# Patient Record
Sex: FEMALE | Race: BLACK OR AFRICAN AMERICAN | NOT HISPANIC OR LATINO | URBAN - METROPOLITAN AREA
[De-identification: names, ages, dates, MRNs, and addresses within clinical notes are randomized per-mention and may not be internally consistent; named-entity substitution may affect disease eponyms.]

---

## 2018-06-29 ENCOUNTER — EMERGENCY (EMERGENCY)
Facility: HOSPITAL | Age: 38
LOS: 0 days | Discharge: HOME | End: 2018-06-29
Attending: EMERGENCY MEDICINE | Admitting: EMERGENCY MEDICINE

## 2018-06-29 VITALS
TEMPERATURE: 99 F | HEART RATE: 78 BPM | OXYGEN SATURATION: 98 % | RESPIRATION RATE: 18 BRPM | DIASTOLIC BLOOD PRESSURE: 59 MMHG | SYSTOLIC BLOOD PRESSURE: 113 MMHG

## 2018-06-29 VITALS
OXYGEN SATURATION: 98 % | DIASTOLIC BLOOD PRESSURE: 71 MMHG | HEART RATE: 73 BPM | TEMPERATURE: 99 F | SYSTOLIC BLOOD PRESSURE: 116 MMHG | RESPIRATION RATE: 18 BRPM

## 2018-06-29 DIAGNOSIS — R19.7 DIARRHEA, UNSPECIFIED: ICD-10-CM

## 2018-06-29 DIAGNOSIS — R10.9 UNSPECIFIED ABDOMINAL PAIN: ICD-10-CM

## 2018-06-29 LAB
ALBUMIN SERPL ELPH-MCNC: 4.4 G/DL — SIGNIFICANT CHANGE UP (ref 3.5–5.2)
ALP SERPL-CCNC: 55 U/L — SIGNIFICANT CHANGE UP (ref 30–115)
ALT FLD-CCNC: 11 U/L — SIGNIFICANT CHANGE UP (ref 0–41)
ANION GAP SERPL CALC-SCNC: 15 MMOL/L — HIGH (ref 7–14)
AST SERPL-CCNC: 16 U/L — SIGNIFICANT CHANGE UP (ref 0–41)
BASOPHILS # BLD AUTO: 0.03 K/UL — SIGNIFICANT CHANGE UP (ref 0–0.2)
BASOPHILS NFR BLD AUTO: 0.4 % — SIGNIFICANT CHANGE UP (ref 0–1)
BILIRUB SERPL-MCNC: 0.5 MG/DL — SIGNIFICANT CHANGE UP (ref 0.2–1.2)
BUN SERPL-MCNC: 10 MG/DL — SIGNIFICANT CHANGE UP (ref 10–20)
CALCIUM SERPL-MCNC: 8.7 MG/DL — SIGNIFICANT CHANGE UP (ref 8.5–10.1)
CHLORIDE SERPL-SCNC: 100 MMOL/L — SIGNIFICANT CHANGE UP (ref 98–110)
CO2 SERPL-SCNC: 22 MMOL/L — SIGNIFICANT CHANGE UP (ref 17–32)
CREAT SERPL-MCNC: 0.9 MG/DL — SIGNIFICANT CHANGE UP (ref 0.7–1.5)
EOSINOPHIL # BLD AUTO: 0.01 K/UL — SIGNIFICANT CHANGE UP (ref 0–0.7)
EOSINOPHIL NFR BLD AUTO: 0.1 % — SIGNIFICANT CHANGE UP (ref 0–8)
GLUCOSE SERPL-MCNC: 106 MG/DL — HIGH (ref 70–99)
HCT VFR BLD CALC: 37.7 % — SIGNIFICANT CHANGE UP (ref 37–47)
HGB BLD-MCNC: 12.8 G/DL — SIGNIFICANT CHANGE UP (ref 12–16)
IMM GRANULOCYTES NFR BLD AUTO: 0.6 % — HIGH (ref 0.1–0.3)
LACTATE SERPL-SCNC: 0.8 MMOL/L — SIGNIFICANT CHANGE UP (ref 0.5–2.2)
LIDOCAIN IGE QN: 24 U/L — SIGNIFICANT CHANGE UP (ref 7–60)
LYMPHOCYTES # BLD AUTO: 0.75 K/UL — LOW (ref 1.2–3.4)
LYMPHOCYTES # BLD AUTO: 8.9 % — LOW (ref 20.5–51.1)
MAGNESIUM SERPL-MCNC: 1.8 MG/DL — SIGNIFICANT CHANGE UP (ref 1.8–2.4)
MCHC RBC-ENTMCNC: 28.6 PG — SIGNIFICANT CHANGE UP (ref 27–31)
MCHC RBC-ENTMCNC: 34 G/DL — SIGNIFICANT CHANGE UP (ref 32–37)
MCV RBC AUTO: 84.2 FL — SIGNIFICANT CHANGE UP (ref 81–99)
MONOCYTES # BLD AUTO: 0.47 K/UL — SIGNIFICANT CHANGE UP (ref 0.1–0.6)
MONOCYTES NFR BLD AUTO: 5.6 % — SIGNIFICANT CHANGE UP (ref 1.7–9.3)
NEUTROPHILS # BLD AUTO: 7.14 K/UL — HIGH (ref 1.4–6.5)
NEUTROPHILS NFR BLD AUTO: 84.4 % — HIGH (ref 42.2–75.2)
PLATELET # BLD AUTO: 217 K/UL — SIGNIFICANT CHANGE UP (ref 130–400)
POTASSIUM SERPL-MCNC: 4.1 MMOL/L — SIGNIFICANT CHANGE UP (ref 3.5–5)
POTASSIUM SERPL-SCNC: 4.1 MMOL/L — SIGNIFICANT CHANGE UP (ref 3.5–5)
PROT SERPL-MCNC: 7.6 G/DL — SIGNIFICANT CHANGE UP (ref 6–8)
RBC # BLD: 4.48 M/UL — SIGNIFICANT CHANGE UP (ref 4.2–5.4)
RBC # FLD: 12 % — SIGNIFICANT CHANGE UP (ref 11.5–14.5)
SODIUM SERPL-SCNC: 137 MMOL/L — SIGNIFICANT CHANGE UP (ref 135–146)
WBC # BLD: 8.45 K/UL — SIGNIFICANT CHANGE UP (ref 4.8–10.8)
WBC # FLD AUTO: 8.45 K/UL — SIGNIFICANT CHANGE UP (ref 4.8–10.8)

## 2018-06-29 RX ORDER — KETOROLAC TROMETHAMINE 30 MG/ML
15 SYRINGE (ML) INJECTION ONCE
Qty: 0 | Refills: 0 | Status: DISCONTINUED | OUTPATIENT
Start: 2018-06-29 | End: 2018-06-29

## 2018-06-29 RX ORDER — SODIUM CHLORIDE 9 MG/ML
1000 INJECTION INTRAMUSCULAR; INTRAVENOUS; SUBCUTANEOUS ONCE
Qty: 0 | Refills: 0 | Status: COMPLETED | OUTPATIENT
Start: 2018-06-29 | End: 2018-06-29

## 2018-06-29 RX ADMIN — Medication 15 MILLIGRAM(S): at 13:00

## 2018-06-29 RX ADMIN — SODIUM CHLORIDE 1000 MILLILITER(S): 9 INJECTION INTRAMUSCULAR; INTRAVENOUS; SUBCUTANEOUS at 13:00

## 2018-06-29 RX ADMIN — Medication 15 MILLIGRAM(S): at 13:30

## 2018-06-29 NOTE — ED PROVIDER NOTE - PHYSICAL EXAMINATION
Constitutional: Well developed, well nourished. NAD.  Head: Normocephalic, atraumatic.  Eyes: PERRL. EOMI.  ENT: No nasal discharge. Mucous membranes slightly dry.  Neck: Supple. Painless ROM.  Cardiovascular: Normal S1, S2. Regular rate and rhythm. No murmurs, rubs, or gallops.  Pulmonary: Normal respiratory rate and effort. Lungs clear to auscultation bilaterally. No wheezing, rales, or rhonchi.  Abdominal: Soft. Nondistended. Nontender. No rebound, guarding, rigidity.  Extremities. Pelvis stable. No lower extremity edema, symmetric calves.  Skin: No rashes, cyanosis.  Neuro: AAOx3. No focal neurological deficits.  Psych: Normal mood. Normal affect.

## 2018-06-29 NOTE — ED PROVIDER NOTE - PROGRESS NOTE DETAILS
Pt feels better at this time. Labs reviewed and discussed with patient, no need for imaging or abx. Strict return precautions given.

## 2018-06-29 NOTE — ED STATDOCS - ATTENDING CONTRIBUTION TO CARE
37 y.o. female c/o intermittent abdominal cramps and diarrhea for 2 days. No fever/chills, CP/SOB, back pain or urinary symptoms. Good appetite. No vaginal discharge or bleeding. On exam, pt in NAD, AAOx3, head NC/AT, CN II-XII intact, lungs CTA B/L, CV S1S2 regular, abdomen soft/NT/ND/(+)BS, ext (-) edema. Will do labs, IVF, toradol and reevaluate.

## 2018-06-29 NOTE — ED PROVIDER NOTE - NS ED ROS FT
Constitutional: No fever, chills.  Eyes: No visual changes.  ENT: No hearing changes. No sore throat.  Neck: No neck pain or stiffness.  Cardiovascular: No chest pain, palpitations, edema.  Pulmonary: No SOB, cough. No hemoptysis.  Abdominal: No abdominal pain, nausea, vomiting. + diarrhea.  : No dysuria, frequency.  Neuro: No headache, syncope, dizziness.  MS: No back pain. No calf pain/swelling.  Psych: No suicidal ideations.

## 2018-06-29 NOTE — ED PROVIDER NOTE - ATTENDING CONTRIBUTION TO CARE
37 y.o. female, no PMH, c/o 3 day h/o diarrhea associated with mild abdominal cramps. No fever/chills, n/v, urinary symptoms or vaginal discharge. Agree with resident's exam. Will 37 y.o. female, no PMH, c/o 3 day h/o diarrhea associated with mild abdominal cramps. No fever/chills, n/v, urinary symptoms or vaginal discharge. Agree with resident's exam. Will do labs, hydrate and reevaluate . 2017 04:00

## 2018-06-29 NOTE — ED PROVIDER NOTE - OBJECTIVE STATEMENT
Pt is a 36 y/o female with no PMH who presents to ED for diarrhea for 3 days, about 5 episodes per day, non bloody. + minimal abd cramps. No n/v, fever, chest pain, SOB.

## 2018-06-29 NOTE — ED ADULT NURSE NOTE - OBJECTIVE STATEMENT
Patient presents with lower severe abdominal pain with loose stool since tuesday. Also complaining of nausea. Denies any increase or burning urination.

## 2024-10-30 ENCOUNTER — INPATIENT (INPATIENT)
Facility: HOSPITAL | Age: 44
LOS: 1 days | Discharge: HOME | DRG: 541 | End: 2024-11-01
Attending: INTERNAL MEDICINE | Admitting: INTERNAL MEDICINE
Payer: COMMERCIAL

## 2024-10-30 VITALS
WEIGHT: 279.99 LBS | SYSTOLIC BLOOD PRESSURE: 117 MMHG | OXYGEN SATURATION: 99 % | HEIGHT: 68 IN | DIASTOLIC BLOOD PRESSURE: 87 MMHG | HEART RATE: 72 BPM | RESPIRATION RATE: 17 BRPM | TEMPERATURE: 99 F

## 2024-10-30 DIAGNOSIS — M86.9 OSTEOMYELITIS, UNSPECIFIED: ICD-10-CM

## 2024-10-30 LAB
ALBUMIN SERPL ELPH-MCNC: 3.7 G/DL — SIGNIFICANT CHANGE UP (ref 3.5–5)
ALP SERPL-CCNC: 62 U/L — SIGNIFICANT CHANGE UP (ref 40–120)
ALT FLD-CCNC: 26 U/L DA — SIGNIFICANT CHANGE UP (ref 10–60)
ANION GAP SERPL CALC-SCNC: 3 MMOL/L — LOW (ref 5–17)
AST SERPL-CCNC: 19 U/L — SIGNIFICANT CHANGE UP (ref 10–40)
BASOPHILS # BLD AUTO: 0.06 K/UL — SIGNIFICANT CHANGE UP (ref 0–0.2)
BASOPHILS NFR BLD AUTO: 0.8 % — SIGNIFICANT CHANGE UP (ref 0–2)
BILIRUB SERPL-MCNC: 0.4 MG/DL — SIGNIFICANT CHANGE UP (ref 0.2–1.2)
BUN SERPL-MCNC: 11 MG/DL — SIGNIFICANT CHANGE UP (ref 7–18)
CALCIUM SERPL-MCNC: 9.1 MG/DL — SIGNIFICANT CHANGE UP (ref 8.4–10.5)
CHLORIDE SERPL-SCNC: 107 MMOL/L — SIGNIFICANT CHANGE UP (ref 96–108)
CO2 SERPL-SCNC: 29 MMOL/L — SIGNIFICANT CHANGE UP (ref 22–31)
CREAT SERPL-MCNC: 0.98 MG/DL — SIGNIFICANT CHANGE UP (ref 0.5–1.3)
CRP SERPL-MCNC: 12.5 MG/L — HIGH (ref 0–5)
EGFR: 73 ML/MIN/1.73M2 — SIGNIFICANT CHANGE UP
EOSINOPHIL # BLD AUTO: 0.29 K/UL — SIGNIFICANT CHANGE UP (ref 0–0.5)
EOSINOPHIL NFR BLD AUTO: 3.9 % — SIGNIFICANT CHANGE UP (ref 0–6)
ERYTHROCYTE [SEDIMENTATION RATE] IN BLOOD: 9 MM/HR — SIGNIFICANT CHANGE UP (ref 0–15)
GLUCOSE SERPL-MCNC: 104 MG/DL — HIGH (ref 70–99)
HCT VFR BLD CALC: 35.6 % — SIGNIFICANT CHANGE UP (ref 34.5–45)
HGB BLD-MCNC: 11.9 G/DL — SIGNIFICANT CHANGE UP (ref 11.5–15.5)
IMM GRANULOCYTES NFR BLD AUTO: 0.3 % — SIGNIFICANT CHANGE UP (ref 0–0.9)
LYMPHOCYTES # BLD AUTO: 2.35 K/UL — SIGNIFICANT CHANGE UP (ref 1–3.3)
LYMPHOCYTES # BLD AUTO: 31.6 % — SIGNIFICANT CHANGE UP (ref 13–44)
MCHC RBC-ENTMCNC: 30 PG — SIGNIFICANT CHANGE UP (ref 27–34)
MCHC RBC-ENTMCNC: 33.4 G/DL — SIGNIFICANT CHANGE UP (ref 32–36)
MCV RBC AUTO: 89.7 FL — SIGNIFICANT CHANGE UP (ref 80–100)
MONOCYTES # BLD AUTO: 0.77 K/UL — SIGNIFICANT CHANGE UP (ref 0–0.9)
MONOCYTES NFR BLD AUTO: 10.3 % — SIGNIFICANT CHANGE UP (ref 2–14)
NEUTROPHILS # BLD AUTO: 3.95 K/UL — SIGNIFICANT CHANGE UP (ref 1.8–7.4)
NEUTROPHILS NFR BLD AUTO: 53.1 % — SIGNIFICANT CHANGE UP (ref 43–77)
NRBC # BLD: 0 /100 WBCS — SIGNIFICANT CHANGE UP (ref 0–0)
PLATELET # BLD AUTO: 242 K/UL — SIGNIFICANT CHANGE UP (ref 150–400)
POTASSIUM SERPL-MCNC: 4.2 MMOL/L — SIGNIFICANT CHANGE UP (ref 3.5–5.3)
POTASSIUM SERPL-SCNC: 4.2 MMOL/L — SIGNIFICANT CHANGE UP (ref 3.5–5.3)
PROT SERPL-MCNC: 7.4 G/DL — SIGNIFICANT CHANGE UP (ref 6–8.3)
RBC # BLD: 3.97 M/UL — SIGNIFICANT CHANGE UP (ref 3.8–5.2)
RBC # FLD: 12.6 % — SIGNIFICANT CHANGE UP (ref 10.3–14.5)
SODIUM SERPL-SCNC: 139 MMOL/L — SIGNIFICANT CHANGE UP (ref 135–145)
WBC # BLD: 7.44 K/UL — SIGNIFICANT CHANGE UP (ref 3.8–10.5)
WBC # FLD AUTO: 7.44 K/UL — SIGNIFICANT CHANGE UP (ref 3.8–10.5)

## 2024-10-30 PROCEDURE — 73630 X-RAY EXAM OF FOOT: CPT | Mod: 26,RT

## 2024-10-30 PROCEDURE — 99285 EMERGENCY DEPT VISIT HI MDM: CPT

## 2024-10-30 PROCEDURE — 93971 EXTREMITY STUDY: CPT | Mod: 26,RT

## 2024-10-30 RX ORDER — CEFEPIME 2 G/1
2000 INJECTION, POWDER, FOR SOLUTION INTRAVENOUS ONCE
Refills: 0 | Status: COMPLETED | OUTPATIENT
Start: 2024-10-30 | End: 2024-10-30

## 2024-10-30 RX ORDER — METRONIDAZOLE 250 MG/1
500 TABLET ORAL ONCE
Refills: 0 | Status: COMPLETED | OUTPATIENT
Start: 2024-10-30 | End: 2024-10-30

## 2024-10-30 RX ORDER — VANCOMYCIN HYDROCHLORIDE 50 MG/ML
1500 KIT ORAL ONCE
Refills: 0 | Status: COMPLETED | OUTPATIENT
Start: 2024-10-30 | End: 2024-10-30

## 2024-10-30 RX ADMIN — CEFEPIME 100 MILLIGRAM(S): 2 INJECTION, POWDER, FOR SOLUTION INTRAVENOUS at 22:47

## 2024-10-30 RX ADMIN — CEFEPIME 2000 MILLIGRAM(S): 2 INJECTION, POWDER, FOR SOLUTION INTRAVENOUS at 23:17

## 2024-10-30 RX ADMIN — METRONIDAZOLE 100 MILLIGRAM(S): 250 TABLET ORAL at 23:08

## 2024-10-30 NOTE — ED PROVIDER NOTE - IV ALTEPLASE INCLUSION HIDDEN
Patient called and requested a referral be put in for her to get a sleep study done. Patient stated that MD suggested that she get it done but never sent one in. Patient notified of call back time Patient requested a call back when referral is sent and stated a detailed message is okay if she does not answer.   show

## 2024-10-30 NOTE — ED PROVIDER NOTE - PHYSICAL EXAMINATION
Afebrile, hemodynamically stable, saturating well on room air  NAD, well appearing, sitting comfortably in bed, no WOB, speaking full sentences  Head NCAT  EOMI grossly, anicteric  MMM  RRR  Breathing comfortably on room air  AAO, CN's 3-12 grossly intact  MCINTYRE spontaneously, right leg edema with reddish spots without confluent erythema, unconnected right second toe swelling, wet sore between second and third toe, although no active drainage, no erythema, no tracking  Skin warm, well perfused

## 2024-10-30 NOTE — ED PROVIDER NOTE - OBJECTIVE STATEMENT
2024 20:19 44-year-old female with no past medical history, presents with swelling to her second right toe as well as sore between the second and third toes that she first noticed yesterday, there was having some discomfort to the area for 1 week. Also noted some purulent discharge today. Went to her PMD and did an x-ray which showed some metatarsal head destruction, and unremarkable labs, though did not get any instructions from her doctor. I reviewed these results on her phone. Denies all other symptoms including fever, chills, weakness or dizziness.

## 2024-10-30 NOTE — ED ADULT NURSE NOTE - NSFALLUNIVINTERV_ED_ALL_ED
Bed/Stretcher in lowest position, wheels locked, appropriate side rails in place/Call bell, personal items and telephone in reach/Instruct patient to call for assistance before getting out of bed/chair/stretcher/Non-slip footwear applied when patient is off stretcher/[place] to call system/Physically safe environment - no spills, clutter or unnecessary equipment/Purposeful proactive rounding/Room/bathroom lighting operational, light cord in reach

## 2024-10-31 DIAGNOSIS — L03.90 CELLULITIS, UNSPECIFIED: ICD-10-CM

## 2024-10-31 DIAGNOSIS — B35.3 TINEA PEDIS: ICD-10-CM

## 2024-10-31 DIAGNOSIS — Z29.9 ENCOUNTER FOR PROPHYLACTIC MEASURES, UNSPECIFIED: ICD-10-CM

## 2024-10-31 LAB
A1C WITH ESTIMATED AVERAGE GLUCOSE RESULT: 5.7 % — HIGH (ref 4–5.6)
ANION GAP SERPL CALC-SCNC: 4 MMOL/L — LOW (ref 5–17)
BUN SERPL-MCNC: 9 MG/DL — SIGNIFICANT CHANGE UP (ref 7–18)
CALCIUM SERPL-MCNC: 8.5 MG/DL — SIGNIFICANT CHANGE UP (ref 8.4–10.5)
CHLORIDE SERPL-SCNC: 105 MMOL/L — SIGNIFICANT CHANGE UP (ref 96–108)
CHOLEST SERPL-MCNC: 162 MG/DL — SIGNIFICANT CHANGE UP
CO2 SERPL-SCNC: 30 MMOL/L — SIGNIFICANT CHANGE UP (ref 22–31)
CREAT SERPL-MCNC: 0.94 MG/DL — SIGNIFICANT CHANGE UP (ref 0.5–1.3)
CRP SERPL-MCNC: 11.8 MG/L — HIGH (ref 0–5)
EGFR: 77 ML/MIN/1.73M2 — SIGNIFICANT CHANGE UP
ERYTHROCYTE [SEDIMENTATION RATE] IN BLOOD: 8 MM/HR — SIGNIFICANT CHANGE UP (ref 0–15)
ESTIMATED AVERAGE GLUCOSE: 117 MG/DL — HIGH (ref 68–114)
GLUCOSE SERPL-MCNC: 156 MG/DL — HIGH (ref 70–99)
HCT VFR BLD CALC: 33.9 % — LOW (ref 34.5–45)
HDLC SERPL-MCNC: 63 MG/DL — SIGNIFICANT CHANGE UP
HGB BLD-MCNC: 11.3 G/DL — LOW (ref 11.5–15.5)
LIPID PNL WITH DIRECT LDL SERPL: 88 MG/DL — SIGNIFICANT CHANGE UP
MAGNESIUM SERPL-MCNC: 1.9 MG/DL — SIGNIFICANT CHANGE UP (ref 1.6–2.6)
MCHC RBC-ENTMCNC: 29.9 PG — SIGNIFICANT CHANGE UP (ref 27–34)
MCHC RBC-ENTMCNC: 33.3 G/DL — SIGNIFICANT CHANGE UP (ref 32–36)
MCV RBC AUTO: 89.7 FL — SIGNIFICANT CHANGE UP (ref 80–100)
NON HDL CHOLESTEROL: 99 MG/DL — SIGNIFICANT CHANGE UP
NRBC # BLD: 0 /100 WBCS — SIGNIFICANT CHANGE UP (ref 0–0)
PHOSPHATE SERPL-MCNC: 3.2 MG/DL — SIGNIFICANT CHANGE UP (ref 2.5–4.5)
PLATELET # BLD AUTO: 214 K/UL — SIGNIFICANT CHANGE UP (ref 150–400)
POTASSIUM SERPL-MCNC: 3.5 MMOL/L — SIGNIFICANT CHANGE UP (ref 3.5–5.3)
POTASSIUM SERPL-SCNC: 3.5 MMOL/L — SIGNIFICANT CHANGE UP (ref 3.5–5.3)
RBC # BLD: 3.78 M/UL — LOW (ref 3.8–5.2)
RBC # FLD: 12.8 % — SIGNIFICANT CHANGE UP (ref 10.3–14.5)
SODIUM SERPL-SCNC: 139 MMOL/L — SIGNIFICANT CHANGE UP (ref 135–145)
TRIGL SERPL-MCNC: 56 MG/DL — SIGNIFICANT CHANGE UP
WBC # BLD: 6.4 K/UL — SIGNIFICANT CHANGE UP (ref 3.8–10.5)
WBC # FLD AUTO: 6.4 K/UL — SIGNIFICANT CHANGE UP (ref 3.8–10.5)

## 2024-10-31 PROCEDURE — 99222 1ST HOSP IP/OBS MODERATE 55: CPT

## 2024-10-31 RX ORDER — ENOXAPARIN SODIUM 40MG/0.4ML
40 SYRINGE (ML) SUBCUTANEOUS EVERY 24 HOURS
Refills: 0 | Status: DISCONTINUED | OUTPATIENT
Start: 2024-10-31 | End: 2024-11-01

## 2024-10-31 RX ORDER — CEFAZOLIN SODIUM 1 G
1000 VIAL (EA) INJECTION EVERY 8 HOURS
Refills: 0 | Status: DISCONTINUED | OUTPATIENT
Start: 2024-10-31 | End: 2024-11-01

## 2024-10-31 RX ORDER — CEFAZOLIN SODIUM 1 G
VIAL (EA) INJECTION
Refills: 0 | Status: DISCONTINUED | OUTPATIENT
Start: 2024-10-31 | End: 2024-11-01

## 2024-10-31 RX ORDER — CLOTRIMAZOLE 10 MG/G
1 CREAM TOPICAL
Refills: 0 | Status: DISCONTINUED | OUTPATIENT
Start: 2024-10-31 | End: 2024-11-01

## 2024-10-31 RX ORDER — CEFAZOLIN SODIUM 1 G
1000 VIAL (EA) INJECTION ONCE
Refills: 0 | Status: COMPLETED | OUTPATIENT
Start: 2024-10-31 | End: 2024-10-31

## 2024-10-31 RX ORDER — ACETAMINOPHEN 500 MG
650 TABLET ORAL EVERY 6 HOURS
Refills: 0 | Status: DISCONTINUED | OUTPATIENT
Start: 2024-10-31 | End: 2024-11-01

## 2024-10-31 RX ADMIN — Medication 100 MILLIGRAM(S): at 03:40

## 2024-10-31 RX ADMIN — Medication 100 MILLIGRAM(S): at 21:14

## 2024-10-31 RX ADMIN — Medication 40 MILLIGRAM(S): at 06:33

## 2024-10-31 RX ADMIN — Medication 100 MILLIGRAM(S): at 13:45

## 2024-10-31 RX ADMIN — CLOTRIMAZOLE 1 APPLICATION(S): 10 CREAM TOPICAL at 17:56

## 2024-10-31 RX ADMIN — VANCOMYCIN HYDROCHLORIDE 250 MILLIGRAM(S): KIT at 00:56

## 2024-10-31 NOTE — H&P ADULT - NSHPREVIEWOFSYSTEMS_GEN_ALL_CORE
- CONSTITUTIONAL: Denies fever and chills  - HEENT: Denies changes in vision and hearing.  - RESPIRATORY: Denies SOB and cough.  - CV: Denies chest pain and palpitations  - GI: Denies abdominal pain, nausea, vomiting and diarrhea.  - : Denies dysuria and urinary frequency.  - SKIN: discharge from digits   - NEUROLOGICAL: Denies headache and syncope.  - PSYCHIATRIC: Denies recent changes in mood. Denies anxiety and depression.

## 2024-10-31 NOTE — CONSULT NOTE ADULT - ASSESSMENT
A:   Right foot 2nd interdigital space serous blister - Superficial, does not probe to bone/capsule  Interdigital maceration, right 2-3rd digits  RLE cellulitis - significantly improved since admission  Pre-diabetic    P:  Patient evaluated, chart reviewed and updated  Xrays reviewed - No gas, no OM, no fxs, no foreign bodies  Requesting MRI of the right foot to r/o OM  Continue abx for the RLE cellulitis  Discussed with patient her diagnosis and treatment options  Deroofed the interdigital blister using sterile forceps and a sterile #15 blade revealing a superficial underlying wound  No underlying abscess or purulence observed. Wound does not probe to bone.   Flushed the blister with a betadine/saline mix  Applied betadine, DSD  Advised patient to keep her dressings clean, dry, and intact at all times  Instructed patient to WBAT to the heel in a surgical shoe only  Informed patient the importance of keeping the webspaces dry  No surgical intervention required at this time  Podiatry will follow while in house  Discussed with attending Dr. Farrell

## 2024-10-31 NOTE — PROGRESS NOTE ADULT - SUBJECTIVE AND OBJECTIVE BOX
PGY-1 Progress Note discussed with attending    INTERVAL HPI/OVERNIGHT EVENTS:     No overnight events. Reports not sleeping very well as she was being admitted to the floors last night. She reports a slight dull pain on the dorsum of her right foot. She went to her PCP (Dr. Doyle Guillaume) 2 days prior to admission and where underwent X-ray of her foot demonstrating erosive irregularity of the third metatarsal head. Following the appointment with her PCP, she noticed brown drainage from her right foot. The dull pain on her foot was present prior to noticing drainage. She also noticed a red rash on the shin of her right leg around this time. The rash is not itchy or painful. She has never experiencing anything like this before. Patient states that she noticed some swelling in her right leg 2-3 months ago but attributes this to new work boots. She endorses occasional spasms and paresthesias on the soles of both her feet.  She states that she has a slight dry cough associated with allergies. She works for the [employer] and spends a lot of time on her feet.      MEDICATIONS  (STANDING):  ceFAZolin   IVPB      ceFAZolin   IVPB 1000 milliGRAM(s) IV Intermittent every 8 hours  enoxaparin Injectable 40 milliGRAM(s) SubCutaneous every 24 hours    MEDICATIONS  (PRN):  acetaminophen     Tablet .. 650 milliGRAM(s) Oral every 6 hours PRN Temp greater or equal to 38C (100.4F), Mild Pain (1 - 3)      REVIEW OF SYSTEMS:  CONSTITUTIONAL: No fever, weight loss, or fatigue  RESPIRATORY: Dry cough. No wheezing, chills or hemoptysis; No shortness of breath  CARDIOVASCULAR: No chest pain, palpitations, dizziness, or leg swelling  GASTROINTESTINAL: No abdominal pain. No nausea, vomiting, or hematemesis; No diarrhea or constipation. No melena or hematochezia.  GENITOURINARY: No dysuria or hematuria, urinary frequency  NEUROLOGICAL: Occasional paresthesias in the soles of feet. No headaches, memory loss, loss of strength, numbness, or tremors  SKIN: Rash on right shin. Lesion between 2nd and 3rd toe.  No itching or burning.    Vital Signs Last 24 Hrs  T(C): 36.8 (31 Oct 2024 05:35), Max: 37 (30 Oct 2024 17:32)  T(F): 98.2 (31 Oct 2024 05:35), Max: 98.6 (30 Oct 2024 17:32)  HR: 58 (31 Oct 2024 05:35) (58 - 72)  BP: 107/69 (31 Oct 2024 05:35) (107/69 - 122/74)  BP(mean): 88 (31 Oct 2024 04:21) (88 - 88)  RR: 18 (31 Oct 2024 05:35) (17 - 18)  SpO2: 97% (31 Oct 2024 05:35) (97% - 99%)    Parameters below as of 31 Oct 2024 05:35  Patient On (Oxygen Delivery Method): room air        PHYSICAL EXAMINATION:  GENERAL: NAD, well built  HEAD:  Atraumatic, Normocephalic  EYES:  conjunctiva and sclera clear  NECK: Supple, No JVD, Normal thyroid  CHEST/LUNG: Clear to auscultation. Clear to percussion bilaterally; No rales, rhonchi, wheezing, or rubs  HEART: Regular rate and rhythm; No murmurs, rubs, or gallops  ABDOMEN: Soft, Nontender, Nondistended; Bowel sounds present  NERVOUS SYSTEM:  No neurosensory deficits in LE. Alert & Oriented X3,    EXTREMITIES:  No calf tenderness. 2+ Peripheral Pulses, No clubbing, cyanosis, or edema  SKIN: Erythematous nontender, nonpruritic, nonblanching rash localized to right shin. Lesion between 2nd and 3rd toe draining white liquid. Rest of skin is warm and dry                          11.3   6.40  )-----------( 214      ( 31 Oct 2024 05:04 )             33.9     10-31    139  |  105  |  9   ----------------------------<  156[H]  3.5   |  30  |  0.94    Ca    8.5      31 Oct 2024 05:04  Phos  3.2     10-31  Mg     1.9     10-31    TPro  7.4  /  Alb  3.7  /  TBili  0.4  /  DBili  x   /  AST  19  /  ALT  26  /  AlkPhos  62  10-30    LIVER FUNCTIONS - ( 30 Oct 2024 20:45 )  Alb: 3.7 g/dL / Pro: 7.4 g/dL / ALK PHOS: 62 U/L / ALT: 26 U/L DA / AST: 19 U/L / GGT: x                   CAPILLARY BLOOD GLUCOSE      RADIOLOGY & ADDITIONAL TESTS:    < from: US Duplex Venous Lower Ext Ltd, Right (10.30.24 @ 20:48) >  IMPRESSION:    No acute DVT of the right lower extremity.    --- End of Report ---    < end of copied text >  < from: Xray Foot AP + Lateral + Oblique, Right (10.30.24 @ 20:56) >  IMPRESSION:    No acute radiographic osseous pathology.  If osteomyelitis is clinically considered  despite conservative therapy   and soft tissue / bone infection requires further assessment, follow-up   MRI recommended.    --- End of Report ---    < end of copied text >                 PGY-1 Progress Note discussed with attending    INTERVAL HPI/OVERNIGHT EVENTS:     No overnight events. Reports not sleeping very well as she was being admitted to the floors last night. She reports a slight dull pain on the dorsum of her right foot. She went to her PCP (Dr. Doyle Guillaume) 2 days prior to admission and where underwent X-ray of her foot demonstrating erosive irregularity of the third metatarsal head. Following the appointment with her PCP, she noticed brown drainage from her right foot. The dull pain on her foot was present prior to noticing drainage. She also noticed a red rash on the shin of her right leg around this time. The rash is not itchy or painful. She has never experiencing anything like this before. Patient states that she noticed some swelling in her right leg 2-3 months ago but attributes this to new work boots. She endorses occasional spasms and paresthesias on the soles of both her feet.  She states that she has a slight dry cough associated with allergies. She works for the [employer] and spends a lot of time on her feet.      MEDICATIONS  (STANDING):  ceFAZolin   IVPB      ceFAZolin   IVPB 1000 milliGRAM(s) IV Intermittent every 8 hours  enoxaparin Injectable 40 milliGRAM(s) SubCutaneous every 24 hours    MEDICATIONS  (PRN):  acetaminophen     Tablet .. 650 milliGRAM(s) Oral every 6 hours PRN Temp greater or equal to 38C (100.4F), Mild Pain (1 - 3)      REVIEW OF SYSTEMS:  CONSTITUTIONAL: No fever, weight loss, or fatigue  RESPIRATORY: Dry cough. No wheezing, chills or hemoptysis; No shortness of breath  CARDIOVASCULAR: No chest pain, palpitations, dizziness, or leg swelling  GASTROINTESTINAL: No abdominal pain. No nausea, vomiting, or hematemesis; No diarrhea or constipation. No melena or hematochezia.  GENITOURINARY: No dysuria or hematuria, urinary frequency  NEUROLOGICAL: Occasional paresthesias in the soles of feet. No headaches, memory loss, loss of strength, numbness, or tremors  SKIN: Rash on right shin. Lesion between 2nd and 3rd toe.  No itching or burning.    Vital Signs Last 24 Hrs  T(C): 36.8 (31 Oct 2024 05:35), Max: 37 (30 Oct 2024 17:32)  T(F): 98.2 (31 Oct 2024 05:35), Max: 98.6 (30 Oct 2024 17:32)  HR: 58 (31 Oct 2024 05:35) (58 - 72)  BP: 107/69 (31 Oct 2024 05:35) (107/69 - 122/74)  BP(mean): 88 (31 Oct 2024 04:21) (88 - 88)  RR: 18 (31 Oct 2024 05:35) (17 - 18)  SpO2: 97% (31 Oct 2024 05:35) (97% - 99%)    Parameters below as of 31 Oct 2024 05:35  Patient On (Oxygen Delivery Method): room air        PHYSICAL EXAMINATION:  GENERAL: NAD, well built  HEAD:  Atraumatic, Normocephalic  EYES:  conjunctiva and sclera clear  NECK: Supple, No JVD  CHEST/LUNG: Clear to auscultation. No rales, rhonchi, wheezing, or rubs  HEART: Regular rate and rhythm; No murmurs, rubs, or gallops  ABDOMEN: Soft, Nontender, Nondistended; Bowel sounds present  NERVOUS SYSTEM:  No neurosensory deficits in LE. Alert & Oriented X3,    EXTREMITIES:  No calf tenderness. 2+ Peripheral Pulses, No clubbing, cyanosis, or edema  SKIN: Erythematous nontender, nonpruritic, nonblanching rash localized to right anterior shin region, sloughing skin between 2nd and 3rd toe draining minimal white liquid. Rest of skin is warm and dry                          11.3   6.40  )-----------( 214      ( 31 Oct 2024 05:04 )             33.9     10-31    139  |  105  |  9   ----------------------------<  156[H]  3.5   |  30  |  0.94    Ca    8.5      31 Oct 2024 05:04  Phos  3.2     10-31  Mg     1.9     10-31    TPro  7.4  /  Alb  3.7  /  TBili  0.4  /  DBili  x   /  AST  19  /  ALT  26  /  AlkPhos  62  10-30    LIVER FUNCTIONS - ( 30 Oct 2024 20:45 )  Alb: 3.7 g/dL / Pro: 7.4 g/dL / ALK PHOS: 62 U/L / ALT: 26 U/L DA / AST: 19 U/L / GGT: x                   CAPILLARY BLOOD GLUCOSE      RADIOLOGY & ADDITIONAL TESTS:    < from: US Duplex Venous Lower Ext Ltd, Right (10.30.24 @ 20:48) >  IMPRESSION:    No acute DVT of the right lower extremity.    --- End of Report ---    < end of copied text >  < from: Xray Foot AP + Lateral + Oblique, Right (10.30.24 @ 20:56) >  IMPRESSION:    No acute radiographic osseous pathology.  If osteomyelitis is clinically considered  despite conservative therapy   and soft tissue / bone infection requires further assessment, follow-up   MRI recommended.    --- End of Report ---    < end of copied text >

## 2024-10-31 NOTE — H&P ADULT - NSHPPHYSICALEXAM_GEN_ALL_CORE
PHYSICAL EXAMINATION:  GENERAL: NAD  HEAD:  Atraumatic, Normocephalic  EYES:  conjunctiva and sclera clear  NECK: Supple, No JVD, Normal thyroid  CHEST/LUNG: Clear to auscultation. Clear to percussion bilaterally; No rales, rhonchi, wheezing, or rubs  HEART: Regular rate and rhythm; No murmurs, rubs, or gallops  ABDOMEN: Soft, Nontender, Nondistended; Bowel sounds present  NERVOUS SYSTEM:  Alert & Oriented X3,    EXTREMITIES:  2+ Peripheral Pulses, No clubbing, cyanosis, or edema  SKIN: warmth noted of to shin in the RLE with erythema. Noted to have skin sloughing between the 2-3 digit, minimal discharge noted

## 2024-10-31 NOTE — H&P ADULT - HISTORY OF PRESENT ILLNESS
Patient is a 45 yo F with pmhx of prediabetes that comes in for pain in the RLE. Per pateint has been having pain in the RLE extremity for 1-2 weeks now for which went to PCP and got an foot xray showing errosive irregularity of the third metatarsal head of the right leg. Patient was  prescribed pain medication that was mildly releving and yesterday patient started massaging foot to help with the pain which noticing that had a brown, clear and red discharge coming off between second and third diigit. Pateint states that has never had this similar presntation in the past, denies any recent abx use, or recent hospitalization. Pateint was recently told that had prediabetes, stating that it runs in the family with mother suffering from it. States that during nights get tingling sensation of the lower extremities along with cramps. Denies any fevers, chills, N/V/D or any other associated symptoms.

## 2024-10-31 NOTE — PATIENT PROFILE ADULT - FUNCTIONAL ASSESSMENT - BASIC MOBILITY 5.
[FreeTextEntry1] : 1) benign findings as above- education\par \par 2) erosion on buccal mucosa\par unclear etiology\par improving per patient\par discussed seeing oral surgery for a bx if not improved in 2-3 weeks\par \par 3) Actinic keratoses as above\par -Treated with cryotherapy x 2, side effects discussed including erythema and scarring, blister formation expected by patient, total freeze time 4 seconds. Wound care reviewed withpatient. Risk of hypo/hyperpigmentation reviewed with patient, and possible need for re-treatment and / or future biopsy also reviewed with patient\par - total # treated- 4\par \par 4) SK\par gratis The specified lesions were treated with liquid nitrogen cryotherapy.  Discussed risks including pain, blistering, crusting, discoloration, recurrence.\par 
4 = No assist / stand by assistance

## 2024-10-31 NOTE — PROGRESS NOTE ADULT - ASSESSMENT
Patient is a 43 yo F with pmhx of prediabetes that comes in for pain in the RLE. Patient to be admitted to the for cellulitis and r/o osteo Patient is a 45 yo F with pmhx of prediabetes that comes in for pain in the RLE. Patient to be admitted to the for cellulitis and r/o osteo, on cefazolin. Podiatry following

## 2024-10-31 NOTE — PROGRESS NOTE ADULT - PROBLEM SELECTOR PLAN 1
presents with 1-2 week hx of RLE pain   went to PCP for which got foot xr showing erosive irregularity of the third metatarsal   s/p vanc, cefepime and flagyl in ED  X-ray in ED showing soft tissue swelling of right 2nd toe and no radiographic evidence of OM  -clinically with cellulitis in the RLE in the anterior region which will treat with cefazolin   - given xr findings concerning for osteo which will need cultures to treat   -podiatry consult  -f/u a1c presents with 1-2 week hx of RLE pain   went to PCP for which got foot xr showing erosive irregularity of the third metatarsal   s/p vanc, cefepime and flagyl in ED  X-ray in ED showing soft tissue swelling of right 2nd toe and no radiographic evidence of OM  A1c 5.7 (was 4.9 in 10/2023)  -clinically with cellulitis in the RLE in the anterior region   - c/w cefazolin IV  - given xr findings concerning for osteo which will need cultures to treat   - consult podiatry presents with 1-2 week hx of RLE pain, concern for OM  went to PCP for which got foot XR showing erosive irregularity of the third metatarsal head on 10/29  X-ray in ED showing soft tissue swelling of right 2nd toe and no radiographic osseous pathology  s/p vanc, cefepime and flagyl in ED  A1c: 5.7 (was 4.9 in 10/2023)  -clinically with cellulitis in the RLE in the anterior region   - c/w cefazolin IV (10/30 - )  - podiatry following presents with 1-2 week hx of RLE pain, concern for OM  went to PCP for which got foot XR showing erosive irregularity of the third metatarsal head on 10/29  X-ray in ED showing soft tissue swelling of right 2nd toe and no radiographic osseous pathology  s/p vanc, cefepime and flagyl in ED  A1c: 5.7 (was 4.9 in 10/2023)  CRP: 12.5 --> 11.8; ESR: 9 -->8  -clinically with cellulitis in the RLE in the anterior region   - c/w cefazolin IV (10/30 - )  - podiatry following presents with 1-2 week hx of RLE pain, concern for OM  went to PCP for which got foot XR showing erosive irregularity of the third metatarsal head on 10/29  X-ray in ED showing soft tissue swelling of right 2nd toe and no radiographic osseous pathology  s/p vanc, cefepime and flagyl in ED  A1c: 5.7 (was 4.9 in 10/2023)  CRP: 12.5 --> 11.8; ESR: 9 -->8  -clinically with cellulitis in the RLE in the anterior region   - c/w cefazolin IV (10/30 - )  - podiatry following, recommended MRI R foot w/o contrast to r/o OM

## 2024-10-31 NOTE — PHARMACOTHERAPY INTERVENTION NOTE - COMMENTS
Patient’s medication profile reviewed. Discussed with patient about their medication. All of patient's questions regarding medications were answered.

## 2024-10-31 NOTE — CONSULT NOTE ADULT - SUBJECTIVE AND OBJECTIVE BOX
Podiatry HPI: Patient is a 44F with a PMHx of prediabetes that presented to the ED yesterday for pain in her RLE. Podiatry was consulted today for a right foot wound and concerns for OM. Patient states that she has had pain in the right foot for about 1-2 weeks, so she went to her PCP where they did an x-ray just in case. She notes that they said there was erosive irregularity of the right 3rd metatarsal head. Patient was given pain medications for the pain which did help mildly. Patient also notes that she saw brown-red drainage coming from between her right 2nd and 3rd digits, so she was concerned that there was an infection and prompted her to come to the hospital. Patient denies taking any antibiotics or having any trauma to the RLE. Patient confirms tingling to her LEs and cramping, but denies any other pedal complaints. Patient denies constitutional symptoms, such as F/C/N/V/SOB. AAOx3, NAD.     HPI: Patient is a 45 yo F with pmhx of prediabetes that comes in for pain in the RLE. Per pateint has been having pain in the RLE extremity for 1-2 weeks now for which went to PCP and got an foot xray showing errosive irregularity of the third metatarsal head of the right leg. Patient was  prescribed pain medication that was mildly releving and yesterday patient started massaging foot to help with the pain which noticing that had a brown, clear and red discharge coming off between second and third diigit. Pateint states that has never had this similar presntation in the past, denies any recent abx use, or recent hospitalization. Pateint was recently told that had prediabetes, stating that it runs in the family with mother suffering from it. States that during nights get tingling sensation of the lower extremities along with cramps. Denies any fevers, chills, N/V/D or any other associated symptoms.     Medications:  acetaminophen     Tablet .. 650 milliGRAM(s) Oral every 6 hours PRN  ceFAZolin   IVPB      ceFAZolin   IVPB 1000 milliGRAM(s) IV Intermittent every 8 hours  clotrimazole 1% Cream 1 Application(s) Topical two times a day  enoxaparin Injectable 40 milliGRAM(s) SubCutaneous every 24 hours    FHx:  FH: type 2 diabetes    PMHx:   Prediabetes     PSHx:  No past surgical history      Labs                          11.3   6.40  )-----------( 214      ( 31 Oct 2024 05:04 )             33.9      10-31    139  |  105  |  9   ----------------------------<  156[H]  3.5   |  30  |  0.94    Ca    8.5      31 Oct 2024 05:04  Phos  3.2     10-31  Mg     1.9     10-31    TPro  7.4  /  Alb  3.7  /  TBili  0.4  /  DBili  x   /  AST  19  /  ALT  26  /  AlkPhos  62  10-30     Vital Signs Last 24 Hrs  T(C): 36.8 (31 Oct 2024 13:39), Max: 37 (30 Oct 2024 17:32)  T(F): 98.2 (31 Oct 2024 13:39), Max: 98.6 (30 Oct 2024 17:32)  HR: 74 (31 Oct 2024 13:39) (58 - 74)  BP: 105/71 (31 Oct 2024 13:39) (105/71 - 122/74)  BP(mean): 88 (31 Oct 2024 04:21) (88 - 88)  RR: 18 (31 Oct 2024 13:39) (17 - 18)  SpO2: 98% (31 Oct 2024 13:39) (97% - 99%)    Parameters below as of 31 Oct 2024 13:39  Patient On (Oxygen Delivery Method): room air    Sedimentation Rate, Erythrocyte: 8 mm/Hr (10-31-24 @ 05:04)  Sedimentation Rate, Erythrocyte: 9 mm/Hr (10-30-24 @ 20:45)         C-Reactive Protein: 11.8 mg/L (10-31-24 @ 05:04)  C-Reactive Protein: 12.5 mg/L (10-30-24 @ 20:45)  WBC Count: 6.40 K/uL (10-31-24 @ 05:04)  WBC Count: 7.44 K/uL (10-30-24 @ 20:45)      LE FOCUSED PHYSICAL EXAM:  Vasc: DP/PT 2/4 bilaterally. CFT <3 seconds x 10. Temp Gradient within normal limits bilaterally, with no increase in warmth over the right foot. Moderate non-pitting edema and erythema observed along the right 2nd-3rd digits extending proximally to the anterior right leg, but significantly improved since admission and antibiotics. No varicosities observed bilaterally.    Derm: Superficial serous blister present with interdigital maceration within the right 2nd webspace. Upon drainage of blister and deroofing, underlying superficial ulcer with a granular woundbed observed, measuring 1.0 cm x 0.4 cm x 0.1 cm. 1cc of serous fluid drained. Wound does not probe to bone or capsule. Mild interdigital maceration with scaling/peeling and without an underlying wound also present to the right 3rd webspace and within the 2-4th digit plantar sulcus. No other open lesions, no other ulcerations, no ecchymosis, no skin tenting, no skin blanching, no fracture blisters, no purulence, no soft tissue crepitus, no fluctuance, no streaking.  Neuro: AAOx3, NAD. Protective sensation is intact via ipswitch 4/4 bilaterally.   MSK: Muscle strength 5/5 in all 4 crural compartments. Moderate pain upon palpation during manual expression. No pain upon ROM of the digits or pedal joints. No crepitation upon ROM of joints. Negative calf tenderness bilaterally. Compartments are compressible, patient able to wiggle toes.       IMAGING:     ACC: [id number] EXAM:  XR FOOT COMP MIN 3 VIEWS RT   ORDERED BY: NI NAPIER   PROCEDURE DATE:  10/30/2024    INTERPRETATION:  RIGHT foot  CLINICAL INFORMATION: Second Toe swelling  TECHNIQUE: AP,lateral and oblique views.    FINDINGS:  Second toe soft tissue swelling without subcutaneous air or radiopaque   foreign body. No osteolysis or radiographic evidence of osteomyelitis.   Remaining osseous and joint structures of the RIGHT foot are   radiographically intact.    IMPRESSION:    No acute radiographic osseous pathology.  If osteomyelitis is clinically considered  despite conservative therapy   and soft tissue / bone infection requires further assessment, follow-up   MRI recommended.    --- End of Report ---

## 2024-10-31 NOTE — H&P ADULT - ASSESSMENT
Patient is a 43 yo F with pmhx of prediabetes that comes in for pain in the RLE. Patient to be admitted to the for cellulitis and r/o osteo

## 2024-10-31 NOTE — H&P ADULT - ATTENDING COMMENTS
Vital Signs Last 24 Hrs  T(C): 36.8 (31 Oct 2024 05:35), Max: 37 (30 Oct 2024 17:32)  T(F): 98.2 (31 Oct 2024 05:35), Max: 98.6 (30 Oct 2024 17:32)  HR: 58 (31 Oct 2024 05:35) (58 - 72)  BP: 107/69 (31 Oct 2024 05:35) (107/69 - 122/74)  BP(mean): 88 (31 Oct 2024 04:21) (88 - 88)  RR: 18 (31 Oct 2024 05:35) (17 - 18)  SpO2: 97% (31 Oct 2024 05:35) (97% - 99%)  Parameters below as of 31 Oct 2024 05:35  Patient On (Oxygen Delivery Method): room air    Labs reviewed  ESR 8   CRP - mildly elevated  No leucocytosis  A1c pending   lipid profile wnr    RLE DVT -ve study    X ray right foot   No acute findings to suggest osteomyelitis    Impression   44 year old woman with hx of pre DM( A1c pending ) who comes in 1 -2 days of right 3rd digit swelling and redness and right leg redness/swelling and warmth. X ray of the right foot done in the outpatient was concerning for osteomyelitis in the outpatient and she decided to come into the ED.   No constitutional symptoms and cbc are unremarkable. acute phase reactant esr is negative while crp is mildly elevated.   Will treat soft tissue infection in R leg with cefazolin   Consult podiatry for evaluation of right 3rd digit wit concern for osteomyelitis.  MRI foot ; if osteomyelitis present, bone biopsy would be preferable to establish organisms present and susceptibility report before initiating antibiotics   IVF hydration   Med reconciliation   Other plans as above

## 2024-10-31 NOTE — H&P ADULT - PROBLEM SELECTOR PLAN 1
-presents with 1-2 week hx of RLE pain   -went to PCP for which got foot xr showing erosive irregularity of the third metatarsal   -noted to have brown, red and white discharge between 2-3 digits of RLE   -noted to have new onset rash up to shin with erythema and warmth   -in the ED s/p vanc, cefepime and flagyl  -clinically with cellulitis but given xr findings concerning for osteo  -will start cefazolin  -f/u foot xr   -podiatry consult  -f/u a1c -presents with 1-2 week hx of RLE pain   -went to PCP for which got foot xr showing erosive irregularity of the third metatarsal   -noted to have brown, red and white discharge between 2-3 digits of RLE   -noted to have new onset rash up to shin with erythema and warmth   -in the ED s/p vanc, cefepime and flagyl  -clinically with cellulitis in the RLE in the antierior region which will treat with cefazolin   - given xr findings concerning for osteo which will need cultures to treat   -f/u foot xr   -podiatry consult  -f/u a1c -presents with 1-2 week hx of RLE pain   -went to PCP for which got foot xr showing erosive irregularity of the third metatarsal   -noted to have brown, red and white discharge between 2-3 digits of RLE   -noted to have new onset rash up to shin with erythema and warmth   -in the ED s/p vanc, cefepime and flagyl  -clinically with cellulitis in the RLE in the anterior region which will treat with cefazolin   - given xr findings concerning for osteo which will need cultures to treat   -f/u foot xr   -podiatry consult  -f/u a1c

## 2024-10-31 NOTE — PATIENT PROFILE ADULT - FALL HARM RISK - HARM RISK INTERVENTIONS

## 2024-11-01 VITALS
SYSTOLIC BLOOD PRESSURE: 108 MMHG | RESPIRATION RATE: 18 BRPM | DIASTOLIC BLOOD PRESSURE: 97 MMHG | TEMPERATURE: 97 F | OXYGEN SATURATION: 98 % | HEART RATE: 71 BPM

## 2024-11-01 LAB
ALBUMIN SERPL ELPH-MCNC: 3.3 G/DL — LOW (ref 3.5–5)
ALP SERPL-CCNC: 54 U/L — SIGNIFICANT CHANGE UP (ref 40–120)
ALT FLD-CCNC: 19 U/L DA — SIGNIFICANT CHANGE UP (ref 10–60)
ANION GAP SERPL CALC-SCNC: 6 MMOL/L — SIGNIFICANT CHANGE UP (ref 5–17)
AST SERPL-CCNC: 15 U/L — SIGNIFICANT CHANGE UP (ref 10–40)
BASOPHILS # BLD AUTO: 0.03 K/UL — SIGNIFICANT CHANGE UP (ref 0–0.2)
BASOPHILS NFR BLD AUTO: 0.6 % — SIGNIFICANT CHANGE UP (ref 0–2)
BILIRUB SERPL-MCNC: 0.5 MG/DL — SIGNIFICANT CHANGE UP (ref 0.2–1.2)
BUN SERPL-MCNC: 12 MG/DL — SIGNIFICANT CHANGE UP (ref 7–18)
CALCIUM SERPL-MCNC: 8.6 MG/DL — SIGNIFICANT CHANGE UP (ref 8.4–10.5)
CHLORIDE SERPL-SCNC: 107 MMOL/L — SIGNIFICANT CHANGE UP (ref 96–108)
CO2 SERPL-SCNC: 26 MMOL/L — SIGNIFICANT CHANGE UP (ref 22–31)
CREAT SERPL-MCNC: 0.81 MG/DL — SIGNIFICANT CHANGE UP (ref 0.5–1.3)
EGFR: 92 ML/MIN/1.73M2 — SIGNIFICANT CHANGE UP
EOSINOPHIL # BLD AUTO: 0.22 K/UL — SIGNIFICANT CHANGE UP (ref 0–0.5)
EOSINOPHIL NFR BLD AUTO: 4.4 % — SIGNIFICANT CHANGE UP (ref 0–6)
GLUCOSE SERPL-MCNC: 113 MG/DL — HIGH (ref 70–99)
HCT VFR BLD CALC: 33.9 % — LOW (ref 34.5–45)
HGB BLD-MCNC: 11.3 G/DL — LOW (ref 11.5–15.5)
IMM GRANULOCYTES NFR BLD AUTO: 0.2 % — SIGNIFICANT CHANGE UP (ref 0–0.9)
LYMPHOCYTES # BLD AUTO: 1.6 K/UL — SIGNIFICANT CHANGE UP (ref 1–3.3)
LYMPHOCYTES # BLD AUTO: 32.3 % — SIGNIFICANT CHANGE UP (ref 13–44)
MAGNESIUM SERPL-MCNC: 1.9 MG/DL — SIGNIFICANT CHANGE UP (ref 1.6–2.6)
MCHC RBC-ENTMCNC: 29.4 PG — SIGNIFICANT CHANGE UP (ref 27–34)
MCHC RBC-ENTMCNC: 33.3 G/DL — SIGNIFICANT CHANGE UP (ref 32–36)
MCV RBC AUTO: 88.1 FL — SIGNIFICANT CHANGE UP (ref 80–100)
MONOCYTES # BLD AUTO: 0.56 K/UL — SIGNIFICANT CHANGE UP (ref 0–0.9)
MONOCYTES NFR BLD AUTO: 11.3 % — SIGNIFICANT CHANGE UP (ref 2–14)
NEUTROPHILS # BLD AUTO: 2.53 K/UL — SIGNIFICANT CHANGE UP (ref 1.8–7.4)
NEUTROPHILS NFR BLD AUTO: 51.2 % — SIGNIFICANT CHANGE UP (ref 43–77)
NRBC # BLD: 0 /100 WBCS — SIGNIFICANT CHANGE UP (ref 0–0)
PHOSPHATE SERPL-MCNC: 3.2 MG/DL — SIGNIFICANT CHANGE UP (ref 2.5–4.5)
PLATELET # BLD AUTO: 217 K/UL — SIGNIFICANT CHANGE UP (ref 150–400)
POTASSIUM SERPL-MCNC: 3.7 MMOL/L — SIGNIFICANT CHANGE UP (ref 3.5–5.3)
POTASSIUM SERPL-SCNC: 3.7 MMOL/L — SIGNIFICANT CHANGE UP (ref 3.5–5.3)
PROT SERPL-MCNC: 6.6 G/DL — SIGNIFICANT CHANGE UP (ref 6–8.3)
RBC # BLD: 3.85 M/UL — SIGNIFICANT CHANGE UP (ref 3.8–5.2)
RBC # FLD: 12.7 % — SIGNIFICANT CHANGE UP (ref 10.3–14.5)
SODIUM SERPL-SCNC: 139 MMOL/L — SIGNIFICANT CHANGE UP (ref 135–145)
WBC # BLD: 4.95 K/UL — SIGNIFICANT CHANGE UP (ref 3.8–10.5)
WBC # FLD AUTO: 4.95 K/UL — SIGNIFICANT CHANGE UP (ref 3.8–10.5)

## 2024-11-01 PROCEDURE — 86140 C-REACTIVE PROTEIN: CPT

## 2024-11-01 PROCEDURE — 84100 ASSAY OF PHOSPHORUS: CPT

## 2024-11-01 PROCEDURE — 80048 BASIC METABOLIC PNL TOTAL CA: CPT

## 2024-11-01 PROCEDURE — 80053 COMPREHEN METABOLIC PANEL: CPT

## 2024-11-01 PROCEDURE — 85652 RBC SED RATE AUTOMATED: CPT

## 2024-11-01 PROCEDURE — 80061 LIPID PANEL: CPT

## 2024-11-01 PROCEDURE — 83735 ASSAY OF MAGNESIUM: CPT

## 2024-11-01 PROCEDURE — 73718 MRI LOWER EXTREMITY W/O DYE: CPT | Mod: 26,RT

## 2024-11-01 PROCEDURE — 73718 MRI LOWER EXTREMITY W/O DYE: CPT | Mod: MC

## 2024-11-01 PROCEDURE — 36415 COLL VENOUS BLD VENIPUNCTURE: CPT

## 2024-11-01 PROCEDURE — 99285 EMERGENCY DEPT VISIT HI MDM: CPT

## 2024-11-01 PROCEDURE — 85027 COMPLETE CBC AUTOMATED: CPT

## 2024-11-01 PROCEDURE — 73630 X-RAY EXAM OF FOOT: CPT

## 2024-11-01 PROCEDURE — 99239 HOSP IP/OBS DSCHRG MGMT >30: CPT | Mod: GC

## 2024-11-01 PROCEDURE — 83036 HEMOGLOBIN GLYCOSYLATED A1C: CPT

## 2024-11-01 PROCEDURE — 85025 COMPLETE CBC W/AUTO DIFF WBC: CPT

## 2024-11-01 PROCEDURE — 93971 EXTREMITY STUDY: CPT

## 2024-11-01 RX ORDER — CEPHALEXIN 125 MG/5ML
1 SUSPENSION, RECONSTITUTED, ORAL (ML) ORAL
Qty: 48 | Refills: 0
Start: 2024-11-01 | End: 2024-11-12

## 2024-11-01 RX ADMIN — CLOTRIMAZOLE 1 APPLICATION(S): 10 CREAM TOPICAL at 05:14

## 2024-11-01 RX ADMIN — Medication 100 MILLIGRAM(S): at 15:45

## 2024-11-01 RX ADMIN — Medication 40 MILLIGRAM(S): at 06:49

## 2024-11-01 RX ADMIN — CLOTRIMAZOLE 1 APPLICATION(S): 10 CREAM TOPICAL at 17:45

## 2024-11-01 RX ADMIN — Medication 100 MILLIGRAM(S): at 05:13

## 2024-11-01 NOTE — DISCHARGE NOTE PROVIDER - DISCHARGE DATE
Two Week Postpartum Office Visit      HISTORY:  Patient is a 41 year old  alert female who presents today for two week postpartum visit.     BIRTH: Baby girl.    CHIEF COMPLAINT: Doing OK.    CURRENT HEALTH CONCERNS: Has a lot of milk!  Unsure about birth control.    Patient's last menstrual period was 2017 (exact date).  ALLERGIES:   Allergen Reactions   • Lactose Intolerance DIARRHEA     Outpatient Prescriptions Marked as Taking for the 17 encounter (Postpartum Visit) with Nidia Simmons CNM   Medication Sig Dispense Refill   • Prenatal Vit-Fe Fumarate-FA (PRENATAL 19) Chew Tab Chew 1 tablet by mouth daily. 100 tablet 3     Past Medical History:   Diagnosis Date   • Postpartum depression    • Psoriasis      History reviewed. No pertinent surgical history.  Social History     Social History   • Marital status:      Spouse name: N/A   • Number of children: N/A   • Years of education: N/A     Occupational History   • Not on file.     Social History Main Topics   • Smoking status: Never Smoker   • Smokeless tobacco: Never Used   • Alcohol use No      Comment: NOT AT THIS TIME   • Drug use: No   • Sexual activity: Yes     Partners: Male     Birth control/ protection: None     Other Topics Concern   •  Service No   • Blood Transfusions No   • Caffeine Concern No   • Occupational Exposure No   • Hobby Hazards No   • Sleep Concern No   • Stress Concern No   • Weight Concern No   • Special Diet No   • Back Care Yes     AT TIMES   • Exercise Yes   • Bike Helmet Yes   • Seat Belt Yes   • Self-Exams Yes     Social History Narrative   • No narrative on file     Family History   Problem Relation Age of Onset   • Diabetes Mother    • Stroke Mother    • Cancer Father      Obstetric History       T4      L4     SAB0   TAB0   Ectopic0   Molar0   Multiple0   Live Births4        REVIEW OF SYSTEMS:  Gen.: No fever, normal weight loss and fatigue.  Eyes: No visual changes or eye  pain.  ENT: No hearing loss, sore throat, or hoarseness.  Heart: No chest pain or palpitations.  Pulmonary: No shortness of breath, cough or wheezing.  Gastrointestinal: No heartburn, vomiting, blood in the stool or change in bowel habits.  Genital: Female- Lochia: Scant vaginal discharge, no pelvic pain or odor.  Urinary: No frequency, urgency, dysuria, incontinence or nocturia.  Skin: No rash, nonhealing lesions or itching.  Neurologic: No headaches, numbness, tingling or focal weakness.  Endocrine: No polyuria, polydipsia, heat intolerance, or cold intolerance.  Hematologic: No edema or night sweats.  Allergies: No hayfever, hives or asthma.  Musculoskeletal: No back pain or arthritis.  Psychiatric: No depression, anxiety, insomnia, or mood changes.  Stressors: Adjusting    OBJECTIVE:  Blood pressure, height, weight, and pulse will be included online. The patient appears healthy and well groomed.    Psychiatric: Normal judgment and insight, oriented ×3. Normal recent and remote memory. Normal mood and affect without anxiety.    ASSESSMENT:  1. Normal postpartum course    2. Rubella non-immune status, antepartum    3. Family planning education, guidance, and counseling        PLAN:  1. Discussed birth control options.  2. Return to clinic in 4 weeks.   01-Nov-2024

## 2024-11-01 NOTE — PROGRESS NOTE ADULT - SUBJECTIVE AND OBJECTIVE BOX
PGY-1 Progress Note discussed with attending    INTERVAL HPI/OVERNIGHT EVENTS:     No overnight events reported. Patient reports that she slept well last night. She denies fever, SOB, and chest pain. She continues to have a rash on her right shin and continues to deny itchiness or pain associated with the rash.    MEDICATIONS  (STANDING):  ceFAZolin   IVPB      ceFAZolin   IVPB 1000 milliGRAM(s) IV Intermittent every 8 hours  clotrimazole 1% Cream 1 Application(s) Topical two times a day  enoxaparin Injectable 40 milliGRAM(s) SubCutaneous every 24 hours    MEDICATIONS  (PRN):  acetaminophen     Tablet .. 650 milliGRAM(s) Oral every 6 hours PRN Temp greater or equal to 38C (100.4F), Mild Pain (1 - 3)      REVIEW OF SYSTEMS:  CONSTITUTIONAL: No fever, weight loss, or fatigue  RESPIRATORY: No cough, wheezing, chills or hemoptysis; No shortness of breath  CARDIOVASCULAR: No chest pain, palpitations, dizziness, or leg swelling  GASTROINTESTINAL: No abdominal pain. No nausea, vomiting, or hematemesis; No diarrhea or constipation.   GENITOURINARY: No dysuria or hematuria, urinary frequency  NEUROLOGICAL: No headaches, memory loss, loss of strength, numbness, or tremors  SKIN: Rash present on anterior right shin. Lesion between 2nd and 3rd toes. No itching, burning     Vital Signs Last 24 Hrs  T(C): 36.8 (01 Nov 2024 04:45), Max: 36.9 (31 Oct 2024 20:47)  T(F): 98.2 (01 Nov 2024 04:45), Max: 98.4 (31 Oct 2024 20:47)  HR: 55 (01 Nov 2024 04:45) (55 - 74)  BP: 114/71 (01 Nov 2024 04:45) (105/71 - 114/71)  BP(mean): --  RR: 18 (01 Nov 2024 04:45) (18 - 18)  SpO2: 97% (01 Nov 2024 04:45) (97% - 99%)    Parameters below as of 01 Nov 2024 04:45  Patient On (Oxygen Delivery Method): room air        PHYSICAL EXAMINATION:  GENERAL: NAD, well built  HEAD:  Atraumatic, Normocephalic  EYES:  conjunctiva and sclera clear  NECK: Supple, No JVD, Normal thyroid  CHEST/LUNG: Clear to auscultation. No rales, rhonchi, wheezing, or rubs  HEART: Regular rate and rhythm; No murmurs, rubs, or gallops  ABDOMEN: Soft, Nontender, Nondistended; Bowel sounds present  NERVOUS SYSTEM: No neurosensory deficits in bilateral LE,  Alert & Oriented X3,    EXTREMITIES:  2+ Peripheral Pulses, No clubbing, cyanosis, or edema  SKIN: erythematous, nonblanching, purpuric rash on right anterior shin; dressing present on lesion between 2nd and 3rd toe is dry and intact,  warm dry                          11.3   4.95  )-----------( 217      ( 01 Nov 2024 06:43 )             33.9     11-01    139  |  107  |  12  ----------------------------<  113[H]  3.7   |  26  |  0.81    Ca    8.6      01 Nov 2024 06:43  Phos  3.2     11-01  Mg     1.9     11-01    TPro  6.6  /  Alb  3.3[L]  /  TBili  0.5  /  DBili  x   /  AST  15  /  ALT  19  /  AlkPhos  54  11-01    LIVER FUNCTIONS - ( 01 Nov 2024 06:43 )  Alb: 3.3 g/dL / Pro: 6.6 g/dL / ALK PHOS: 54 U/L / ALT: 19 U/L DA / AST: 15 U/L / GGT: x                   CAPILLARY BLOOD GLUCOSE      RADIOLOGY & ADDITIONAL TESTS:                   PGY-1 Progress Note discussed with attending    INTERVAL HPI/OVERNIGHT EVENTS:     No overnight events reported. Patient reports that she slept well last night. She denies fever, SOB, and chest pain. She continues to have a rash on her right shin and continues to deny itchiness or pain associated with the rash.    MEDICATIONS  (STANDING):  ceFAZolin   IVPB      ceFAZolin   IVPB 1000 milliGRAM(s) IV Intermittent every 8 hours  clotrimazole 1% Cream 1 Application(s) Topical two times a day  enoxaparin Injectable 40 milliGRAM(s) SubCutaneous every 24 hours    MEDICATIONS  (PRN):  acetaminophen     Tablet .. 650 milliGRAM(s) Oral every 6 hours PRN Temp greater or equal to 38C (100.4F), Mild Pain (1 - 3)      REVIEW OF SYSTEMS:  CONSTITUTIONAL: No fever, weight loss, or fatigue  RESPIRATORY: No cough, No shortness of breath  CARDIOVASCULAR: No chest pain  GASTROINTESTINAL: No abdominal pain  GENITOURINARY: No dysuria  NEUROLOGICAL: No headaches  SKIN: Rash present on anterior right shin. Lesion between 2nd and 3rd toes. No itching, burning     Vital Signs Last 24 Hrs  T(C): 36.8 (01 Nov 2024 04:45), Max: 36.9 (31 Oct 2024 20:47)  T(F): 98.2 (01 Nov 2024 04:45), Max: 98.4 (31 Oct 2024 20:47)  HR: 55 (01 Nov 2024 04:45) (55 - 74)  BP: 114/71 (01 Nov 2024 04:45) (105/71 - 114/71)  BP(mean): --  RR: 18 (01 Nov 2024 04:45) (18 - 18)  SpO2: 97% (01 Nov 2024 04:45) (97% - 99%)    Parameters below as of 01 Nov 2024 04:45  Patient On (Oxygen Delivery Method): room air        PHYSICAL EXAMINATION:  GENERAL: NAD, well built  HEAD:  Atraumatic, Normocephalic  EYES:  conjunctiva and sclera clear  NECK: Supple, No JVD  CHEST/LUNG: Clear to auscultation. No rales, rhonchi, wheezing, or rubs  HEART: Regular rate and rhythm; No murmurs, rubs, or gallops  ABDOMEN: Soft, Nontender, Nondistended; Bowel sounds present  NERVOUS SYSTEM: No neurosensory deficits in bilateral LE,  Alert & Oriented X3,    EXTREMITIES:  2+ Peripheral Pulses, trace edema RLE  SKIN: erythematous, nonblanching, purpuric rash on right anterior shin; dressing present on lesion between 2nd and 3rd toe is dry and intact, no drainage or open skin on right anterior shin, warm dry                          11.3   4.95  )-----------( 217      ( 01 Nov 2024 06:43 )             33.9     11-01    139  |  107  |  12  ----------------------------<  113[H]  3.7   |  26  |  0.81    Ca    8.6      01 Nov 2024 06:43  Phos  3.2     11-01  Mg     1.9     11-01    TPro  6.6  /  Alb  3.3[L]  /  TBili  0.5  /  DBili  x   /  AST  15  /  ALT  19  /  AlkPhos  54  11-01    LIVER FUNCTIONS - ( 01 Nov 2024 06:43 )  Alb: 3.3 g/dL / Pro: 6.6 g/dL / ALK PHOS: 54 U/L / ALT: 19 U/L DA / AST: 15 U/L / GGT: x                   CAPILLARY BLOOD GLUCOSE      RADIOLOGY & ADDITIONAL TESTS:                   PGY-1 Progress Note discussed with attending    INTERVAL HPI/OVERNIGHT EVENTS:     No overnight events reported. Patient reports that she slept well last night. She denies fever, SOB, and chest pain. She continues to have a rash on her right shin and continues to deny itchiness or pain associated with the rash.    MEDICATIONS  (STANDING):  ceFAZolin   IVPB      ceFAZolin   IVPB 1000 milliGRAM(s) IV Intermittent every 8 hours  clotrimazole 1% Cream 1 Application(s) Topical two times a day  enoxaparin Injectable 40 milliGRAM(s) SubCutaneous every 24 hours    MEDICATIONS  (PRN):  acetaminophen     Tablet .. 650 milliGRAM(s) Oral every 6 hours PRN Temp greater or equal to 38C (100.4F), Mild Pain (1 - 3)      REVIEW OF SYSTEMS:  CONSTITUTIONAL: No fever, weight loss, or fatigue  RESPIRATORY: No cough, No shortness of breath  CARDIOVASCULAR: No chest pain  GASTROINTESTINAL: No abdominal pain  GENITOURINARY: No dysuria  NEUROLOGICAL: No headaches  SKIN: Rash present on anterior right shin. Lesion between 2nd and 3rd toes. No itching, burning     Vital Signs Last 24 Hrs  T(C): 36.8 (01 Nov 2024 04:45), Max: 36.9 (31 Oct 2024 20:47)  T(F): 98.2 (01 Nov 2024 04:45), Max: 98.4 (31 Oct 2024 20:47)  HR: 55 (01 Nov 2024 04:45) (55 - 74)  BP: 114/71 (01 Nov 2024 04:45) (105/71 - 114/71)  BP(mean): --  RR: 18 (01 Nov 2024 04:45) (18 - 18)  SpO2: 97% (01 Nov 2024 04:45) (97% - 99%)    Parameters below as of 01 Nov 2024 04:45  Patient On (Oxygen Delivery Method): room air        PHYSICAL EXAMINATION:  GENERAL: NAD, well built  HEAD:  Atraumatic, Normocephalic  EYES:  conjunctiva and sclera clear  NECK: Supple, No JVD  CHEST/LUNG: Clear to auscultation. No rales, rhonchi, wheezing, or rubs  HEART: Regular rate and rhythm; No murmurs, rubs, or gallops  ABDOMEN: Soft, Nontender, Nondistended; Bowel sounds present  NERVOUS SYSTEM: No neurosensory deficits in bilateral LE,  Alert & Oriented X3,    EXTREMITIES:  2+ Peripheral Pulses, trace edema RLE  SKIN: erythematous, nonblanching, purpuric rash on right anterior shin; dressing present on lesion between 2nd and 3rd toe is dry and intact, no drainage or open skin on right anterior shin, warm dry                          11.3   4.95  )-----------( 217      ( 01 Nov 2024 06:43 )             33.9     11-01    139  |  107  |  12  ----------------------------<  113[H]  3.7   |  26  |  0.81    Ca    8.6      01 Nov 2024 06:43  Phos  3.2     11-01  Mg     1.9     11-01    TPro  6.6  /  Alb  3.3[L]  /  TBili  0.5  /  DBili  x   /  AST  15  /  ALT  19  /  AlkPhos  54  11-01    LIVER FUNCTIONS - ( 01 Nov 2024 06:43 )  Alb: 3.3 g/dL / Pro: 6.6 g/dL / ALK PHOS: 54 U/L / ALT: 19 U/L DA / AST: 15 U/L / GGT: x                   CAPILLARY BLOOD GLUCOSE      RADIOLOGY & ADDITIONAL TESTS:    < from: MR Foot No Cont, Right (11.01.24 @ 15:20) >  Impression:    No evidence for acute osteomyelitis. No visualized abscess. Mild T2   signal hyperintensity within the dorsal subcutaneous soft tissues is   indeterminate, and clinical correlation for cellulitis is recommended.    --- Endof Report ---    < end of copied text >

## 2024-11-01 NOTE — PROGRESS NOTE ADULT - ASSESSMENT
A:   Right foot 2nd interdigital space serous blister - Superficial, does not probe to bone/capsule  Interdigital maceration, right 2-3rd digits - Improved  RLE cellulitis - significantly improved since admission  Tinea pedis  Pre-diabetic    P:  Patient evaluated, chart reviewed and updated  Xrays reviewed - No gas, no OM, no fxs, no foreign bodies  MRI reviewed - No OM, no abscess, no fractures. Soft tissue edema dorsal foot indicative of cellulitis  Continue abx for the RLE cellulitis, please provide 5 more days of Keflex TID upon discharge  Please provide patient with Naftin powder upon discharge for tinea pedis of her interdigital spaces  Discussed with patient her diagnosis and treatment options   Applied betadine, DSD  Advised patient to keep her dressings clean, dry, and intact at all times  Instructed patient to WBAT in a surgical shoe   Informed patient the importance of keeping the webspaces dry  All questions and concerns addressed to the patient's satisfaction. Patient demonstrated verbal understanding  Patient is stable from podiatry's standpoint, no surgical intervention required at this time  Podiatry will follow while in house  Upon discharge, patient may follow up outpatient with Dr. Nikos Gaffney at [phone] [place] [address]. Please call [phone] to make an appointment within 1 week.   Discussed with attending Dr. Farrell

## 2024-11-01 NOTE — PROGRESS NOTE ADULT - SUBJECTIVE AND OBJECTIVE BOX
Podiatry Interval: Patient was seen bedside resting comfortably. No acute overnight events noted. Patient states she is still having mild pain around the wound, but overall improved from when she was first admitted. Patient denies any malodor or drainage coming from her dressings. Patient denies any numbness, tingling, or burning. Patient denies calf tenderness bilaterally. She has been compliant with keeping his dressings clean, dry, and intact. Patient denies any other pedal complaints and no constitutional symptoms such as F/C/N/V/SOB. AAOx3, NAD.    Podiatry HPI: Patient is a 44F with a PMHx of prediabetes that presented to the ED yesterday for pain in her RLE. Podiatry was consulted today for a right foot wound and concerns for OM. Patient states that she has had pain in the right foot for about 1-2 weeks, so she went to her PCP where they did an x-ray just in case. She notes that they said there was erosive irregularity of the right 3rd metatarsal head. Patient was given pain medications for the pain which did help mildly. Patient also notes that she saw brown-red drainage coming from between her right 2nd and 3rd digits, so she was concerned that there was an infection and prompted her to come to the hospital. Patient denies taking any antibiotics or having any trauma to the RLE. Patient confirms tingling to her LEs and cramping, but denies any other pedal complaints. Patient denies constitutional symptoms, such as F/C/N/V/SOB. AAOx3, NAD.     HPI: Patient is a 45 yo F with pmhx of prediabetes that comes in for pain in the RLE. Per pateint has been having pain in the RLE extremity for 1-2 weeks now for which went to PCP and got an foot xray showing errosive irregularity of the third metatarsal head of the right leg. Patient was  prescribed pain medication that was mildly releving and yesterday patient started massaging foot to help with the pain which noticing that had a brown, clear and red discharge coming off between second and third diigit. Pateint states that has never had this similar presntation in the past, denies any recent abx use, or recent hospitalization. Pateint was recently told that had prediabetes, stating that it runs in the family with mother suffering from it. States that during nights get tingling sensation of the lower extremities along with cramps. Denies any fevers, chills, N/V/D or any other associated symptoms.       Medications:  acetaminophen     Tablet .. 650 milliGRAM(s) Oral every 6 hours PRN  ceFAZolin   IVPB      ceFAZolin   IVPB 1000 milliGRAM(s) IV Intermittent every 8 hours  clotrimazole 1% Cream 1 Application(s) Topical two times a day  enoxaparin Injectable 40 milliGRAM(s) SubCutaneous every 24 hours    FHx:  FH: type 2 diabetes    PMHx:   Prediabetes    PSHx:  No past surgical history      Labs                          11.3   4.95  )-----------( 217      ( 01 Nov 2024 06:43 )             33.9      11-01    139  |  107  |  12  ----------------------------<  113[H]  3.7   |  26  |  0.81    Ca    8.6      01 Nov 2024 06:43  Phos  3.2     11-01  Mg     1.9     11-01    TPro  6.6  /  Alb  3.3[L]  /  TBili  0.5  /  DBili  x   /  AST  15  /  ALT  19  /  AlkPhos  54  11-01     Vital Signs Last 24 Hrs  T(C): 36.3 (01 Nov 2024 13:55), Max: 36.9 (31 Oct 2024 20:47)  T(F): 97.3 (01 Nov 2024 13:55), Max: 98.4 (31 Oct 2024 20:47)  HR: 71 (01 Nov 2024 13:55) (55 - 71)  BP: 108/97 (01 Nov 2024 13:55) (108/76 - 114/71)  BP(mean): --  RR: 18 (01 Nov 2024 13:55) (18 - 18)  SpO2: 98% (01 Nov 2024 13:55) (97% - 99%)    Parameters below as of 01 Nov 2024 13:55  Patient On (Oxygen Delivery Method): room air    Sedimentation Rate, Erythrocyte: 8 mm/Hr (10-31-24 @ 05:04)  Sedimentation Rate, Erythrocyte: 9 mm/Hr (10-30-24 @ 20:45)         C-Reactive Protein: 11.8 mg/L (10-31-24 @ 05:04)  C-Reactive Protein: 12.5 mg/L (10-30-24 @ 20:45)  WBC Count: 4.95 K/uL (11-01-24 @ 06:43)      LE FOCUSED PHYSICAL EXAM:  Vasc: DP/PT 2/4 bilaterally. CFT <3 seconds x 10. Temp Gradient within normal limits bilaterally, with no increase in warmth over the right foot. Moderate non-pitting edema and erythema observed along the right 2nd-3rd digits extending proximally to the anterior right leg, but significantly improved since admission and antibiotics. No varicosities observed bilaterally.    Derm: Superficial serous blister present with interdigital maceration within the right 2nd webspace. Upon drainage of blister and deroofing, underlying superficial ulcer with a granular woundbed observed, measuring 1.0 cm x 0.4 cm x 0.1 cm. 1cc of serous fluid drained day of consultation. Wound does not probe to bone or capsule. Mild interdigital maceration with scaling/peeling and without an underlying wound also present to the right 3rd webspace and within the 2-4th digit plantar sulcus. No other open lesions, no other ulcerations, no ecchymosis, no skin tenting, no skin blanching, no fracture blisters, no purulence, no soft tissue crepitus, no fluctuance, no streaking.  Neuro: AAOx3, NAD. Protective sensation is intact via ipswitch 4/4 bilaterally.   MSK: Muscle strength 5/5 in all 4 crural compartments. Moderate pain upon palpation during manual expression. No pain upon ROM of the digits or pedal joints. No crepitation upon ROM of joints. Negative calf tenderness bilaterally. Compartments are compressible, patient able to wiggle toes.       IMAGING:     ACC: [id number] EXAM:  XR FOOT COMP MIN 3 VIEWS RT   ORDERED BY: NI NAPIER   PROCEDURE DATE:  10/30/2024    INTERPRETATION:  RIGHT foot  CLINICAL INFORMATION: Second Toe swelling  TECHNIQUE: AP,lateral and oblique views.    FINDINGS:  Second toe soft tissue swelling without subcutaneous air or radiopaque   foreign body. No osteolysis or radiographic evidence of osteomyelitis.   Remaining osseous and joint structures of the RIGHT foot are   radiographically intact.    IMPRESSION:    No acute radiographic osseous pathology.  If osteomyelitis is clinically considered  despite conservative therapy   and soft tissue / bone infection requires further assessment, follow-up   MRI recommended.    --- End of Report ---

## 2024-11-01 NOTE — PROGRESS NOTE ADULT - ASSESSMENT
Patient is a 45 yo F with pmhx of prediabetes that comes in for pain in the RLE. Patient to be admitted to the for cellulitis and r/o osteo, on cefazolin. Podiatry following

## 2024-11-01 NOTE — PROGRESS NOTE ADULT - PROBLEM SELECTOR PLAN 2
b/w 2nd and 3rd interdigital web space  start clotrimazole cream (10/31 - ) can be b/w 2nd and 3rd interdigital web space? vs interdigital blister? b/w 2nd and 3rd toe digit  c/w clotrimazole cream (10/31 - )

## 2024-11-01 NOTE — DISCHARGE NOTE PROVIDER - ATTENDING DISCHARGE PHYSICAL EXAMINATION:
T(C): 36.3 (11-01-24 @ 13:55), Max: 36.3 (11-01-24 @ 13:55)  HR: 71 (11-01-24 @ 13:55) (71 - 71)  BP: 108/97 (11-01-24 @ 13:55) (108/97 - 108/97)  RR: 18 (11-01-24 @ 13:55) (18 - 18)  SpO2: 98% (11-01-24 @ 13:55) (98% - 98%)    PHYSICAL EXAM:  GENERAL: NAD, lying in bed  HEAD:  Atraumatic, Normocephalic  EYES: EOMI, PERRLA, conjunctiva and sclera clear  NECK: Supple, No elevated JVD  CHEST/LUNG: Clear to auscultation bilaterally; No wheeze  HEART: Regular rate and rhythm; No murmurs, rubs, or gallops  ABDOMEN: Soft, Nontender, Nondistended; Bowel sounds present  EXTREMITIES:  2+ Peripheral Pulses, No clubbing, cyanosis, or edema  PSYCH: AAOx3  NEUROLOGY: CN II-XII grossly intact, moving all extremities  SKIN: erythema over anterior RLE, no purulence

## 2024-11-01 NOTE — DISCHARGE NOTE PROVIDER - CARE PROVIDER_API CALL
Doyle Guillaume  Internal Medicine  [address] [place] [address]  Phone: [phone]  Fax: [phone]  Follow Up Time: 1 week

## 2024-11-01 NOTE — DISCHARGE NOTE NURSING/CASE MANAGEMENT/SOCIAL WORK - PATIENT PORTAL LINK FT
You can access the FollowMyHealth Patient Portal offered by [facility] by registering at the following website: [email or web address]. By joining [facility] FollowMyHealth portal, you will also be able to view your health information using other applications (apps) compatible with our system.

## 2024-11-01 NOTE — DISCHARGE NOTE PROVIDER - NSDCCAREPROVSEEN_GEN_ALL_CORE_FT
Qian, Lamont Licea, Celine Weinstein, Sebastian Burnett, Jeanette Ivan, Demar Slaughter, Ying Castañeda

## 2024-11-01 NOTE — DISCHARGE NOTE NURSING/CASE MANAGEMENT/SOCIAL WORK - FINANCIAL ASSISTANCE
[facility] provides services at a reduced cost to those who are determined to be eligible through [facility]’s financial assistance program. Information regarding [facility]’s financial assistance program can be found by going to [email or web address] or by calling [phone].

## 2024-11-01 NOTE — DISCHARGE NOTE PROVIDER - HOSPITAL COURSE
A 44 year old female, from home, with a PMHx of Prediabetes that presented to the ED for pain in her RLE that started about 1-2 weeks prior to arrival. She visited her PCP where X ray was performed and showed erosive irregularity of the right 3rd metatarsal head. Patient was given pain medications for the pain which helped. However, she saw brown-red drainage coming from her right 2nd and 3rd digits, so she was concerned that there was an infection which prompted her to come to the hospital. Patient denied taking any antibiotics or having any trauma to the RLE. She had associated tingling to her LEs and cramping. Denies any chest pain, dyspnea, palpitations, nausea, vomiting, chills, numbness, headache or weakness. No acute radiographic osseous pathology were seen on X ray in the ED. Admitted to medicine for right lower extremity cellulitis management. Patient started on Cefazolin 1g q8hr. CRP was 12.5 which downtrended to 11.8. ESR was 9 which downtrended to 8. Podiatry was consulted and recommended MRI of the foot to rule out osteomyelitis. No evidence for acute osteomyelitis was seen on MRI. Patient is being transition to PO antibiotics with Cephalexin to continue care at home.    Patient is stable for discharge per primary team and is advised to follow up with PCP as outpatient  Please refer to patient's complete medical chart with documents for a full hospital course, this is only a brief summary.

## 2024-11-01 NOTE — PROGRESS NOTE ADULT - ATTENDING COMMENTS
44W PMH pre-DM, presents with RLE cellulitis, interdigital blister. Outpatient x-ray demonstrating findings concerning for osteomyelitis. CRP is elevated.     #Cellulitis  #rule out osteomyelitis  -cellulitis is non-purulent, continuing with cefazolin  - awaitign MRI R foot to assess for osteomyelitis; appreciate podiatry recommendations; contacted MRI dept to expedite
C/w IV cefazolin   Interdigital blister vs fungal infection- start topical Clotrimazole   appreciate podiatry recs, follow MR foot

## 2024-11-01 NOTE — PROGRESS NOTE ADULT - PROBLEM SELECTOR PLAN 1
presents with 1-2 week hx of RLE pain, concern for OM  went to PCP for which got foot XR showing erosive irregularity of the third metatarsal head on 10/29  X-ray in ED showing soft tissue swelling of right 2nd toe and no radiographic osseous pathology  s/p vanc, cefepime and flagyl in ED  A1c: 5.7 (was 4.9 in 10/2023)  CRP: 12.5 --> 11.8; ESR: 9 -->8  -clinically with cellulitis in the RLE in the anterior region   - c/w cefazolin IV (10/30 - )  - podiatry following  - f/u MRI foot presents with 1-2 week hx of RLE pain, concern for OM  went to PCP for which got foot XR showing erosive irregularity of the third metatarsal head on 10/29  X-ray in ED showing soft tissue swelling of right 2nd toe and no radiographic osseous pathology  s/p vanc, cefepime and flagyl in ED  A1c: 5.7 (was 4.9 in 10/2023)  CRP: 12.5 --> 11.8; ESR: 9 -->8  MRI foot shows no evidence of acute osteomyelitis  -clinically with cellulitis in the RLE in the anterior region   - c/w cefazolin IV (10/30 - )  - podiatry following presents with 1-2 week hx of RLE pain, concern for OM  went to PCP for which got foot XR showing erosive irregularity of the third metatarsal head on 10/29  X-ray in ED showing soft tissue swelling of right 2nd toe and no radiographic osseous pathology  s/p vanc, cefepime and flagyl in ED  A1c: 5.7 (was 4.9 in 10/2023)  CRP: 12.5 --> 11.8; ESR: 9 -->8  MRI foot shows no evidence of acute osteomyelitis, No visualized abscess.  -clinically with cellulitis in the RLE in the anterior region   - c/w cefazolin IV (10/30 - )  - podiatry following

## 2024-11-01 NOTE — DISCHARGE NOTE PROVIDER - NSDCCPCAREPLAN_GEN_ALL_CORE_FT
PRINCIPAL DISCHARGE DIAGNOSIS  Diagnosis: Cellulitis  Assessment and Plan of Treatment: You were diagnosed with cellulitis of the right lower extremity which means bacterial infection of the skin. You had signs of local inflammation redness, swelling, warmth and pain. You were treated with IV antibiotic called Cefazolin and you are being transitioned to oral antibiotics with Cephalexin. MRI did not show any signs of osteomyelitis which means no infection of the bone. Please take Cephalexin as prescribed to complete a total 14 day course of treatment until November 13th, 2024 and follow up with PCP within a week from discharge for further recommendations.      SECONDARY DISCHARGE DIAGNOSES  Diagnosis: Prediabetes  Assessment and Plan of Treatment: You have a history of Prediabetes which means you have a higher than normal blood sugar level. It's not high enough to be considered type 2 diabetes yet. Your HbA1c was 5.7% during this admission. You need to continue monitoring your blood sugar levels closely and maintain healthy lifestyle by eating healthy diabetic regimen, weight loss and exercise regularly as tolerated. Please follow up with your PCP and/or Endocrinologist within a week of discharge for further management.